# Patient Record
Sex: MALE | Race: WHITE | NOT HISPANIC OR LATINO
[De-identification: names, ages, dates, MRNs, and addresses within clinical notes are randomized per-mention and may not be internally consistent; named-entity substitution may affect disease eponyms.]

---

## 2017-01-27 ENCOUNTER — APPOINTMENT (OUTPATIENT)
Dept: CARDIOLOGY | Facility: CLINIC | Age: 52
End: 2017-01-27

## 2017-02-03 ENCOUNTER — APPOINTMENT (OUTPATIENT)
Dept: CARDIOLOGY | Facility: CLINIC | Age: 52
End: 2017-02-03

## 2017-02-03 VITALS
DIASTOLIC BLOOD PRESSURE: 88 MMHG | HEART RATE: 67 BPM | WEIGHT: 194 LBS | HEIGHT: 69 IN | SYSTOLIC BLOOD PRESSURE: 124 MMHG | BODY MASS INDEX: 28.73 KG/M2

## 2017-02-14 ENCOUNTER — MEDICATION RENEWAL (OUTPATIENT)
Age: 52
End: 2017-02-14

## 2017-06-02 ENCOUNTER — APPOINTMENT (OUTPATIENT)
Dept: CARDIOLOGY | Facility: CLINIC | Age: 52
End: 2017-06-02

## 2018-01-05 ENCOUNTER — APPOINTMENT (OUTPATIENT)
Dept: CARDIOLOGY | Facility: CLINIC | Age: 53
End: 2018-01-05

## 2018-01-05 VITALS
HEIGHT: 69 IN | BODY MASS INDEX: 27.7 KG/M2 | SYSTOLIC BLOOD PRESSURE: 142 MMHG | WEIGHT: 187 LBS | DIASTOLIC BLOOD PRESSURE: 86 MMHG | HEART RATE: 71 BPM

## 2018-02-23 ENCOUNTER — APPOINTMENT (OUTPATIENT)
Dept: CARDIOLOGY | Facility: CLINIC | Age: 53
End: 2018-02-23

## 2018-04-06 ENCOUNTER — APPOINTMENT (OUTPATIENT)
Dept: CARDIOLOGY | Facility: CLINIC | Age: 53
End: 2018-04-06

## 2018-05-02 ENCOUNTER — INPATIENT (INPATIENT)
Facility: HOSPITAL | Age: 53
LOS: 0 days | Discharge: HOME | End: 2018-05-03
Attending: INTERNAL MEDICINE | Admitting: INTERNAL MEDICINE

## 2018-05-02 VITALS
HEART RATE: 79 BPM | TEMPERATURE: 96 F | DIASTOLIC BLOOD PRESSURE: 85 MMHG | RESPIRATION RATE: 18 BRPM | SYSTOLIC BLOOD PRESSURE: 147 MMHG | OXYGEN SATURATION: 100 %

## 2018-05-02 DIAGNOSIS — R07.9 CHEST PAIN, UNSPECIFIED: ICD-10-CM

## 2018-05-02 DIAGNOSIS — Z98.890 OTHER SPECIFIED POSTPROCEDURAL STATES: Chronic | ICD-10-CM

## 2018-05-02 DIAGNOSIS — Z90.49 ACQUIRED ABSENCE OF OTHER SPECIFIED PARTS OF DIGESTIVE TRACT: Chronic | ICD-10-CM

## 2018-05-02 DIAGNOSIS — E11.9 TYPE 2 DIABETES MELLITUS WITHOUT COMPLICATIONS: ICD-10-CM

## 2018-05-02 DIAGNOSIS — I10 ESSENTIAL (PRIMARY) HYPERTENSION: ICD-10-CM

## 2018-05-02 DIAGNOSIS — E78.00 PURE HYPERCHOLESTEROLEMIA, UNSPECIFIED: ICD-10-CM

## 2018-05-02 LAB
ALBUMIN SERPL ELPH-MCNC: 4.1 G/DL — SIGNIFICANT CHANGE UP (ref 3.5–5.2)
ALP SERPL-CCNC: 52 U/L — SIGNIFICANT CHANGE UP (ref 30–115)
ALT FLD-CCNC: 25 U/L — SIGNIFICANT CHANGE UP (ref 0–41)
ANION GAP SERPL CALC-SCNC: 17 MMOL/L — HIGH (ref 7–14)
APTT BLD: 26.3 SEC — LOW (ref 27–39.2)
AST SERPL-CCNC: 24 U/L — SIGNIFICANT CHANGE UP (ref 0–41)
BASOPHILS # BLD AUTO: 0.04 K/UL — SIGNIFICANT CHANGE UP (ref 0–0.2)
BASOPHILS NFR BLD AUTO: 0.5 % — SIGNIFICANT CHANGE UP (ref 0–1)
BILIRUB SERPL-MCNC: 1.1 MG/DL — SIGNIFICANT CHANGE UP (ref 0.2–1.2)
BUN SERPL-MCNC: 15 MG/DL — SIGNIFICANT CHANGE UP (ref 10–20)
CALCIUM SERPL-MCNC: 8.9 MG/DL — SIGNIFICANT CHANGE UP (ref 8.5–10.1)
CHLORIDE SERPL-SCNC: 96 MMOL/L — LOW (ref 98–110)
CK SERPL-CCNC: 66 U/L — SIGNIFICANT CHANGE UP (ref 0–225)
CK SERPL-CCNC: 89 U/L — SIGNIFICANT CHANGE UP (ref 0–225)
CO2 SERPL-SCNC: 23 MMOL/L — SIGNIFICANT CHANGE UP (ref 17–32)
CREAT SERPL-MCNC: 0.8 MG/DL — SIGNIFICANT CHANGE UP (ref 0.7–1.5)
EOSINOPHIL # BLD AUTO: 0.36 K/UL — SIGNIFICANT CHANGE UP (ref 0–0.7)
EOSINOPHIL NFR BLD AUTO: 4.2 % — SIGNIFICANT CHANGE UP (ref 0–8)
GLUCOSE SERPL-MCNC: 220 MG/DL — HIGH (ref 70–99)
HCT VFR BLD CALC: 43.6 % — SIGNIFICANT CHANGE UP (ref 42–52)
HGB BLD-MCNC: 15.3 G/DL — SIGNIFICANT CHANGE UP (ref 14–18)
IMM GRANULOCYTES NFR BLD AUTO: 0.5 % — HIGH (ref 0.1–0.3)
INR BLD: 1.17 RATIO — SIGNIFICANT CHANGE UP (ref 0.65–1.3)
LIDOCAIN IGE QN: 41 U/L — SIGNIFICANT CHANGE UP (ref 7–60)
LYMPHOCYTES # BLD AUTO: 1.76 K/UL — SIGNIFICANT CHANGE UP (ref 1.2–3.4)
LYMPHOCYTES # BLD AUTO: 20.5 % — SIGNIFICANT CHANGE UP (ref 20.5–51.1)
MCHC RBC-ENTMCNC: 30 PG — SIGNIFICANT CHANGE UP (ref 27–31)
MCHC RBC-ENTMCNC: 35.1 G/DL — SIGNIFICANT CHANGE UP (ref 32–37)
MCV RBC AUTO: 85.5 FL — SIGNIFICANT CHANGE UP (ref 80–94)
MONOCYTES # BLD AUTO: 0.67 K/UL — HIGH (ref 0.1–0.6)
MONOCYTES NFR BLD AUTO: 7.8 % — SIGNIFICANT CHANGE UP (ref 1.7–9.3)
NEUTROPHILS # BLD AUTO: 5.71 K/UL — SIGNIFICANT CHANGE UP (ref 1.4–6.5)
NEUTROPHILS NFR BLD AUTO: 66.5 % — SIGNIFICANT CHANGE UP (ref 42.2–75.2)
NRBC # BLD: 0 /100 WBCS — SIGNIFICANT CHANGE UP (ref 0–0)
PLATELET # BLD AUTO: 262 K/UL — SIGNIFICANT CHANGE UP (ref 130–400)
POTASSIUM SERPL-MCNC: 4.5 MMOL/L — SIGNIFICANT CHANGE UP (ref 3.5–5)
POTASSIUM SERPL-SCNC: 4.5 MMOL/L — SIGNIFICANT CHANGE UP (ref 3.5–5)
PROT SERPL-MCNC: 6.7 G/DL — SIGNIFICANT CHANGE UP (ref 6–8)
PROTHROM AB SERPL-ACNC: 12.7 SEC — SIGNIFICANT CHANGE UP (ref 9.95–12.87)
RBC # BLD: 5.1 M/UL — SIGNIFICANT CHANGE UP (ref 4.7–6.1)
RBC # FLD: 11.5 % — SIGNIFICANT CHANGE UP (ref 11.5–14.5)
SODIUM SERPL-SCNC: 136 MMOL/L — SIGNIFICANT CHANGE UP (ref 135–146)
TROPONIN T SERPL-MCNC: <0.01 NG/ML — SIGNIFICANT CHANGE UP
TROPONIN T SERPL-MCNC: <0.01 NG/ML — SIGNIFICANT CHANGE UP
WBC # BLD: 8.58 K/UL — SIGNIFICANT CHANGE UP (ref 4.8–10.8)
WBC # FLD AUTO: 8.58 K/UL — SIGNIFICANT CHANGE UP (ref 4.8–10.8)

## 2018-05-02 RX ORDER — CARVEDILOL PHOSPHATE 80 MG/1
12.5 CAPSULE, EXTENDED RELEASE ORAL EVERY 12 HOURS
Qty: 0 | Refills: 0 | Status: DISCONTINUED | OUTPATIENT
Start: 2018-05-02 | End: 2018-05-03

## 2018-05-02 RX ORDER — DEXTROSE 50 % IN WATER 50 %
1 SYRINGE (ML) INTRAVENOUS ONCE
Qty: 0 | Refills: 0 | Status: DISCONTINUED | OUTPATIENT
Start: 2018-05-02 | End: 2018-05-03

## 2018-05-02 RX ORDER — ATORVASTATIN CALCIUM 80 MG/1
10 TABLET, FILM COATED ORAL AT BEDTIME
Qty: 0 | Refills: 0 | Status: DISCONTINUED | OUTPATIENT
Start: 2018-05-02 | End: 2018-05-03

## 2018-05-02 RX ORDER — GLIMEPIRIDE 1 MG
4 TABLET ORAL
Qty: 0 | Refills: 0 | COMMUNITY

## 2018-05-02 RX ORDER — DEXTROSE 50 % IN WATER 50 %
25 SYRINGE (ML) INTRAVENOUS ONCE
Qty: 0 | Refills: 0 | Status: DISCONTINUED | OUTPATIENT
Start: 2018-05-02 | End: 2018-05-03

## 2018-05-02 RX ORDER — CARVEDILOL PHOSPHATE 80 MG/1
0 CAPSULE, EXTENDED RELEASE ORAL
Qty: 0 | Refills: 0 | COMMUNITY

## 2018-05-02 RX ORDER — LORATADINE 10 MG/1
10 TABLET ORAL AT BEDTIME
Qty: 0 | Refills: 0 | Status: DISCONTINUED | OUTPATIENT
Start: 2018-05-02 | End: 2018-05-03

## 2018-05-02 RX ORDER — ACETAMINOPHEN 500 MG
650 TABLET ORAL EVERY 6 HOURS
Qty: 0 | Refills: 0 | Status: DISCONTINUED | OUTPATIENT
Start: 2018-05-02 | End: 2018-05-03

## 2018-05-02 RX ORDER — CANAGLIFLOZIN AND METFORMIN HYDROCHLORIDE 50; 500 MG/1; MG/1
0 TABLET, FILM COATED, EXTENDED RELEASE ORAL
Qty: 0 | Refills: 0 | COMMUNITY

## 2018-05-02 RX ORDER — ENOXAPARIN SODIUM 100 MG/ML
40 INJECTION SUBCUTANEOUS DAILY
Qty: 0 | Refills: 0 | Status: DISCONTINUED | OUTPATIENT
Start: 2018-05-02 | End: 2018-05-03

## 2018-05-02 RX ORDER — ASPIRIN/CALCIUM CARB/MAGNESIUM 324 MG
0 TABLET ORAL
Qty: 0 | Refills: 0 | COMMUNITY

## 2018-05-02 RX ORDER — DEXTROSE 50 % IN WATER 50 %
12.5 SYRINGE (ML) INTRAVENOUS ONCE
Qty: 0 | Refills: 0 | Status: DISCONTINUED | OUTPATIENT
Start: 2018-05-02 | End: 2018-05-03

## 2018-05-02 RX ORDER — INSULIN LISPRO 100/ML
5 VIAL (ML) SUBCUTANEOUS
Qty: 0 | Refills: 0 | Status: DISCONTINUED | OUTPATIENT
Start: 2018-05-02 | End: 2018-05-03

## 2018-05-02 RX ORDER — IBUPROFEN 200 MG
400 TABLET ORAL EVERY 8 HOURS
Qty: 0 | Refills: 0 | Status: DISCONTINUED | OUTPATIENT
Start: 2018-05-02 | End: 2018-05-03

## 2018-05-02 RX ORDER — INSULIN GLARGINE 100 [IU]/ML
15 INJECTION, SOLUTION SUBCUTANEOUS AT BEDTIME
Qty: 0 | Refills: 0 | Status: DISCONTINUED | OUTPATIENT
Start: 2018-05-02 | End: 2018-05-03

## 2018-05-02 RX ORDER — GLUCAGON INJECTION, SOLUTION 0.5 MG/.1ML
1 INJECTION, SOLUTION SUBCUTANEOUS ONCE
Qty: 0 | Refills: 0 | Status: DISCONTINUED | OUTPATIENT
Start: 2018-05-02 | End: 2018-05-03

## 2018-05-02 RX ORDER — NIFEDIPINE 30 MG
30 TABLET, EXTENDED RELEASE 24 HR ORAL DAILY
Qty: 0 | Refills: 0 | Status: DISCONTINUED | OUTPATIENT
Start: 2018-05-02 | End: 2018-05-03

## 2018-05-02 RX ORDER — ASPIRIN/CALCIUM CARB/MAGNESIUM 324 MG
325 TABLET ORAL ONCE
Qty: 0 | Refills: 0 | Status: DISCONTINUED | OUTPATIENT
Start: 2018-05-02 | End: 2018-05-02

## 2018-05-02 RX ORDER — ATORVASTATIN CALCIUM 80 MG/1
0 TABLET, FILM COATED ORAL
Qty: 0 | Refills: 0 | COMMUNITY

## 2018-05-02 RX ORDER — SODIUM CHLORIDE 9 MG/ML
1000 INJECTION, SOLUTION INTRAVENOUS
Qty: 0 | Refills: 0 | Status: DISCONTINUED | OUTPATIENT
Start: 2018-05-02 | End: 2018-05-03

## 2018-05-02 RX ORDER — LISINOPRIL 2.5 MG/1
10 TABLET ORAL DAILY
Qty: 0 | Refills: 0 | Status: DISCONTINUED | OUTPATIENT
Start: 2018-05-02 | End: 2018-05-03

## 2018-05-02 RX ORDER — NIFEDIPINE 30 MG
0 TABLET, EXTENDED RELEASE 24 HR ORAL
Qty: 0 | Refills: 0 | COMMUNITY

## 2018-05-02 RX ORDER — GLIMEPIRIDE 1 MG
0 TABLET ORAL
Qty: 0 | Refills: 0 | COMMUNITY

## 2018-05-02 RX ORDER — ASPIRIN/CALCIUM CARB/MAGNESIUM 324 MG
81 TABLET ORAL DAILY
Qty: 0 | Refills: 0 | Status: DISCONTINUED | OUTPATIENT
Start: 2018-05-02 | End: 2018-05-03

## 2018-05-02 RX ORDER — LISINOPRIL 2.5 MG/1
0 TABLET ORAL
Qty: 0 | Refills: 0 | COMMUNITY

## 2018-05-02 RX ORDER — FAMOTIDINE 10 MG/ML
20 INJECTION INTRAVENOUS DAILY
Qty: 0 | Refills: 0 | Status: DISCONTINUED | OUTPATIENT
Start: 2018-05-02 | End: 2018-05-03

## 2018-05-02 RX ADMIN — INSULIN GLARGINE 15 UNIT(S): 100 INJECTION, SOLUTION SUBCUTANEOUS at 21:51

## 2018-05-02 RX ADMIN — ATORVASTATIN CALCIUM 10 MILLIGRAM(S): 80 TABLET, FILM COATED ORAL at 21:50

## 2018-05-02 RX ADMIN — CARVEDILOL PHOSPHATE 12.5 MILLIGRAM(S): 80 CAPSULE, EXTENDED RELEASE ORAL at 17:36

## 2018-05-02 RX ADMIN — LORATADINE 10 MILLIGRAM(S): 10 TABLET ORAL at 21:50

## 2018-05-02 RX ADMIN — Medication 5 UNIT(S): at 17:35

## 2018-05-02 NOTE — ED PROVIDER NOTE - OBJECTIVE STATEMENT
Patient pmhx DM, HTN, Hypercholesterolemia presents to the ED for evaluation of exertional chest pain intermittent for the past week radiating to left arm and left jaw area. No pain at present. No abd pain, no shortness of breath. +URI symptoms the week prior but improved.

## 2018-05-02 NOTE — ED PROVIDER NOTE - ATTENDING CONTRIBUTION TO CARE
52yoM with h/o MI s/p stenting p/w chest pain worsened by exertion with occasional radiation to L jaw and arm. Denies leg pain/swelling/dizziness/SOB. On exam, afebrile, hemodynamically stable, saturating well, NAD, well appearing, head NCAT, EOMI grossly, anicteric, MMM, no JVD, RRR, nml S1/S2, no m/r/g, lungs CTAB, no w/r/r, abd soft, NT, ND, nml BS, no rebound or guarding, AAO, CN's 3-12 grossly intact, CAUSEY spontaneously, no leg cyanosis or edema, skin warm, well perfused, no rashes or hives. ECG/trop unremarkable however significant cardiac hx, d/w Dr. Borja who is the patient's cardiologist and evaluated pt and requested full admission for cardiac w/u. Low suspicion by character for dissection and no murmur or pulse asymmetry. Low suspicion for PE by character and lack of associated symptoms. No PTX or PNA on exam or Xray. Patient very well appearing, hemodynamically stable. Given ASA. Admitted to tele.

## 2018-05-02 NOTE — ED PROVIDER NOTE - CARE PLAN
Principal Discharge DX:	Chest pain Principal Discharge DX:	Chest pain, rule out acute myocardial infarction

## 2018-05-02 NOTE — H&P ADULT - PROBLEM SELECTOR PLAN 1
rule out stable angina/CAD vs pericarditis/pleuritis vs costochondritis vs psychosomatic  cardiac enzymes x 2  cardiology evaluation: stress test?  aspirin, statin, B blocker, ACE inhibitor  PRN pain meds tylenol/NSAIDs  admit to telemetry

## 2018-05-02 NOTE — H&P ADULT - PMH
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

## 2018-05-02 NOTE — ED PROVIDER NOTE - PHYSICAL EXAMINATION
Gen: Alert, NAD, well appearing  Neck: +supple, no tenderness  Pulm: Bilateral BS, normal resp effort, no wheeze/stridor/retractions  CV: RRR, no M/R/G, +dist pulses  Abd: soft, NT/ND  Mskel: no edema/erythema/cyanosis  Skin: no rash, warm/dry  Neuro: AAOx3

## 2018-05-02 NOTE — CONSULT NOTE ADULT - SUBJECTIVE AND OBJECTIVE BOX
Cardiology Consultation    CHIEF COMPLAINT: Patient is a 52y old  Male who presents with a chief complaint of chest pain of 2 weeks duration (02 May 2018 15:53)      HPI:  52 y.o man with PMH CAD s/p PCI(2 stents in 2014), HTN, DM, DL presenting from home for chest pain of 2 weeks duration    History goes back to 2 weeks prior to presentation, patient started complaining of intermittent left sided chest pain multiple times/day of seconds to minutes duration, sharp in nature, radiating to the right chest, neck, left shoulder and left arm, worse with exertion, relieved by lying in bed. Patient denies any associated SOB, nausea, cold sweating no palpitations.  1 week prior to presentation, patient had dry cough with runny nose and was feeling warm with chills did not check his temperature no phlegm no headaches. + sick contacts with similar symptoms.  The last few days prior to presentation, pain became more severe and sharp so he decided to present to the ED for further investigations. (02 May 2018 15:53)      PAST MEDICAL & SURGICAL HISTORY:  Coronary artery disease  High cholesterol  Hypertension, unspecified type  H/O shoulder surgery  H/O knee surgery  History of appendectomy      SOCIAL HISTORY: Non smoker, no etoh or drug abuse    FAMILY HISTORY: FAMILY HISTORY:      Home Medications:  Aspirin Low Dose 81 mg oral delayed release tablet: 1 tab(s) orally once a day (02 May 2018 16:23)  atorvastatin 10 mg oral tablet: 1 tab(s) orally once a day (02 May 2018 16:23)  carvedilol: 12.5  orally 2 times a day (02 May 2018 16:23)  glimepiride 4 mg oral tablet: orally 2 times a day (02 May 2018 16:23)  Invokamet 50 mg-1000 mg oral tablet: 1 tab(s) orally 2 times a day (with meals) (02 May 2018 16:23)  lisinopril 10 mg oral tablet: 1 tab(s) orally once a day (02 May 2018 16:23)  NIFEdipine 30 mg oral tablet, extended release: 1 tab(s) orally once a day (02 May 2018 16:23)      MEDICATIONS  (STANDING):  aspirin enteric coated 81 milliGRAM(s) Oral daily  atorvastatin 10 milliGRAM(s) Oral at bedtime  carvedilol 12.5 milliGRAM(s) Oral every 12 hours  dextrose 5%. 1000 milliLiter(s) (50 mL/Hr) IV Continuous <Continuous>  dextrose 50% Injectable 12.5 Gram(s) IV Push once  dextrose 50% Injectable 25 Gram(s) IV Push once  dextrose 50% Injectable 25 Gram(s) IV Push once  enoxaparin Injectable 40 milliGRAM(s) SubCutaneous daily  famotidine    Tablet 20 milliGRAM(s) Oral daily  insulin glargine Injectable (LANTUS) 15 Unit(s) SubCutaneous at bedtime  insulin lispro Injectable (HumaLOG) 5 Unit(s) SubCutaneous before breakfast  insulin lispro Injectable (HumaLOG) 5 Unit(s) SubCutaneous before lunch  insulin lispro Injectable (HumaLOG) 5 Unit(s) SubCutaneous before dinner  lisinopril 10 milliGRAM(s) Oral daily  loratadine 10 milliGRAM(s) Oral at bedtime  NIFEdipine XL 30 milliGRAM(s) Oral daily    MEDICATIONS  (PRN):  acetaminophen  Suppository. 650 milliGRAM(s) Rectal every 6 hours PRN Mild Pain (1 - 3)  dextrose Gel 1 Dose(s) Oral once PRN Blood Glucose LESS THAN 70 milliGRAM(s)/deciliter  glucagon  Injectable 1 milliGRAM(s) IntraMuscular once PRN Glucose LESS THAN 70 milligrams/deciliter  ibuprofen  Tablet 400 milliGRAM(s) Oral every 8 hours PRN moderate pain      Allergies    penicillin (Unknown)    Intolerances        REVIEW OF SYSTEMS:    All other review of systems is negative unless indicated above    VITAL SIGNS:   Vital Signs Last 24 Hrs  T(C): 36.3 (02 May 2018 16:18), Max: 36.3 (02 May 2018 16:18)  T(F): 97.3 (02 May 2018 16:18), Max: 97.3 (02 May 2018 16:18)  HR: 74 (02 May 2018 16:18) (69 - 79)  BP: 148/86 (02 May 2018 16:18) (147/85 - 150/82)  BP(mean): --  RR: 18 (02 May 2018 16:18) (18 - 18)  SpO2: 98% (02 May 2018 15:26) (98% - 100%)    I&O's Summary      PHYSICAL EXAM:    Constitutional: NAD, awake and alert, well-developed  Pulmonary: Non-labored, breath sounds are clear bilaterally, No wheezing, rales or rhonchi  Cardiovascular: PMI not palpable non-displaced Regular S1 and S2, no murmurs, rubs, gallops or clicks  Gastrointestinal: Bowel Sounds present, soft, nontender.   Neurological: Alert, no focal deficits  LE: no edema      LABS: All Labs Reviewed:                        15.3   8.58  )-----------( 262      ( 02 May 2018 12:53 )             43.6     02 May 2018 12:53    136    |  96     |  15     ----------------------------<  220    4.5     |  23     |  0.8      Ca    8.9        02 May 2018 12:53    TPro  6.7    /  Alb  4.1    /  TBili  1.1    /  DBili  x      /  AST  24     /  ALT  25     /  AlkPhos  52     02 May 2018 12:53      CARDIAC MARKERS ( 02 May 2018 12:53 )  x     / <0.01 ng/mL / 89 U/L / x     / x            RADIOLOGY/EKG:    < from: 12 Lead ECG (05.02.18 @ 11:52) >  Ventricular Rate 71 BPM    Atrial Rate 71 BPM    P-R Interval 138 ms    QRS Duration 102 ms    Q-T Interval 410 ms    QTC Calculation(Bezet) 445 ms    P Axis 37 degrees    R Axis 28 degrees    T Axis 40 degrees    Diagnosis Line Normal sinus rhythm  Normal ECG    < end of copied text >

## 2018-05-02 NOTE — ED PROVIDER NOTE - NS ED ROS FT
Review of Systems  Constitutional: (-) fever  Eyes/ENT: (-) blurry vision, (-) epistaxis  Cardiovascular: (+) chest pain, (-) syncope  Respiratory: (-) cough, (-) shortness of breath  Gastrointestinal: (-) vomiting, (-) diarrhea  Musculoskeletal: (-) neck pain, (-) back pain, (-) joint pain  Integumentary: (-) rash, (-) edema  Neurological: (-) headache

## 2018-05-02 NOTE — ED PROVIDER NOTE - PROGRESS NOTE DETAILS
Case discussed with Dr Borja will send fellow to see patient Case discussed with Dr Borja will send fellow to see patient, sts patient can be admitted to tele if normal trop

## 2018-05-02 NOTE — H&P ADULT - ASSESSMENT
52 y.o man with PMH CAD s/p PCI(2 stents in 2014), HTN, DM, DL presenting from home for chest pain of 2 weeks duration

## 2018-05-02 NOTE — H&P ADULT - NSHPLABSRESULTS_GEN_ALL_CORE
15.3   8.58  )-----------( 262      ( 02 May 2018 12:53 )             43.6     05-02    136  |  96<L>  |  15  ----------------------------<  220<H>  4.5   |  23  |  0.8    Ca    8.9      02 May 2018 12:53    TPro  6.7  /  Alb  4.1  /  TBili  1.1  /  DBili  x   /  AST  24  /  ALT  25  /  AlkPhos  52  05-02    EKG normal  CXR no acute findings

## 2018-05-02 NOTE — H&P ADULT - NSHPPHYSICALEXAM_GEN_ALL_CORE
Vital Signs Last 24 Hrs  T(C): 36.1 (02 May 2018 15:26), Max: 36.1 (02 May 2018 15:26)  T(F): 96.9 (02 May 2018 15:26), Max: 96.9 (02 May 2018 15:26)  HR: 69 (02 May 2018 15:26) (69 - 79)  BP: 150/82 (02 May 2018 15:26) (147/85 - 150/82)  RR: 18 (02 May 2018 15:26) (18 - 18)  SpO2: 98% (02 May 2018 15:26) (98% - 100%)  Patient is sitting in bed in no distress  AAO3  heart regular s1-s2 no murmur no JVD   Lungs good air entry   abdomen soft non tender  no LE edema Vital Signs Last 24 Hrs  T(C): 36.1 (02 May 2018 15:26), Max: 36.1 (02 May 2018 15:26)  T(F): 96.9 (02 May 2018 15:26), Max: 96.9 (02 May 2018 15:26)  HR: 69 (02 May 2018 15:26) (69 - 79)  BP: 150/82 (02 May 2018 15:26) (147/85 - 150/82)  RR: 18 (02 May 2018 15:26) (18 - 18)  SpO2: 98% (02 May 2018 15:26) (98% - 100%)  Patient is sitting in bed in no distress  AAO3  heart regular s1-s2 no murmur no JVD   Lungs good air entry  + chest tenderness  abdomen soft non tender  no LE edema

## 2018-05-02 NOTE — ED ADULT NURSE NOTE - PMH
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

## 2018-05-02 NOTE — H&P ADULT - ATTENDING COMMENTS
pt seen and examined independently, admitted with chest paIN, ruled out mi with cardiac enzymes,   saw cardio in er, underwent nuclear stress, if negative dc home '  fu with dr xavier

## 2018-05-02 NOTE — CONSULT NOTE ADULT - ASSESSMENT
51 y/o M with significant h/o CAD s/p PCI 2014 coming for chest pain X 2 weeks    A/P    Atypical chest pain   CAD s/p PCI  HTN  HLD    Recommendations    Continue ASA, Statin and BB  Repeat CE if 2nd set -ve get exercise nuclear stress test in AM 53 y/o M with significant h/o CAD s/p PCI 2014 coming for chest pain X 2 weeks    A/P    Atypical chest pain   CAD s/p PCI  HTN  HLD    Recommendations    Continue ASA, Statin and BB  Repeat CE if 2nd set -ve get exercise nuclear stress test in AM  check A1c, lipid panel  If stress mpi low risk - med rx, mod to high risk - cath

## 2018-05-03 ENCOUNTER — TRANSCRIPTION ENCOUNTER (OUTPATIENT)
Age: 53
End: 2018-05-03

## 2018-05-03 VITALS
RESPIRATION RATE: 18 BRPM | DIASTOLIC BLOOD PRESSURE: 70 MMHG | SYSTOLIC BLOOD PRESSURE: 122 MMHG | TEMPERATURE: 97 F | HEART RATE: 66 BPM

## 2018-05-03 LAB
ESTIMATED AVERAGE GLUCOSE: 246 MG/DL — HIGH (ref 68–114)
HBA1C BLD-MCNC: 10.2 % — HIGH (ref 4–5.6)

## 2018-05-03 RX ADMIN — FAMOTIDINE 20 MILLIGRAM(S): 10 INJECTION INTRAVENOUS at 13:43

## 2018-05-03 RX ADMIN — Medication 5 UNIT(S): at 09:25

## 2018-05-03 RX ADMIN — CARVEDILOL PHOSPHATE 12.5 MILLIGRAM(S): 80 CAPSULE, EXTENDED RELEASE ORAL at 18:04

## 2018-05-03 RX ADMIN — ENOXAPARIN SODIUM 40 MILLIGRAM(S): 100 INJECTION SUBCUTANEOUS at 13:42

## 2018-05-03 RX ADMIN — CARVEDILOL PHOSPHATE 12.5 MILLIGRAM(S): 80 CAPSULE, EXTENDED RELEASE ORAL at 06:34

## 2018-05-03 RX ADMIN — Medication 1 DROP(S): at 18:04

## 2018-05-03 RX ADMIN — Medication 5 UNIT(S): at 13:43

## 2018-05-03 RX ADMIN — Medication 1 DROP(S): at 06:34

## 2018-05-03 RX ADMIN — LISINOPRIL 10 MILLIGRAM(S): 2.5 TABLET ORAL at 06:34

## 2018-05-03 RX ADMIN — Medication 30 MILLIGRAM(S): at 06:34

## 2018-05-03 RX ADMIN — Medication 81 MILLIGRAM(S): at 13:43

## 2018-05-03 NOTE — DISCHARGE NOTE ADULT - MEDICATION SUMMARY - MEDICATIONS TO TAKE
I will START or STAY ON the medications listed below when I get home from the hospital:    Aspirin Low Dose 81 mg oral delayed release tablet  -- 1 tab(s) by mouth once a day  -- Indication: For Coronary artery disease    lisinopril 10 mg oral tablet  -- 1 tab(s) by mouth once a day  -- Indication: For Hypertension, unspecified type    glimepiride 4 mg oral tablet  -- orally 2 times a day  -- Indication: For Diabetes    Invokamet 50 mg-1000 mg oral tablet  -- 1 tab(s) by mouth 2 times a day (with meals)  -- Indication: For Diabetes    atorvastatin 10 mg oral tablet  -- 1 tab(s) by mouth once a day  -- Indication: For High cholesterol    carvedilol  -- 12.5  by mouth 2 times a day  -- Indication: For Hypertension, unspecified type    NIFEdipine 30 mg oral tablet, extended release  -- 1 tab(s) by mouth once a day  -- Indication: For Hypertension, unspecified type

## 2018-05-03 NOTE — PROGRESS NOTE ADULT - SUBJECTIVE AND OBJECTIVE BOX
SUBJECTIVE:    Patient is a 52y old Male who presents with a chief complaint of chest pain of 2 weeks duration (02 May 2018 15:53)    Currently admitted to medicine with the primary diagnosis of Chest pain, rule out acute myocardial infarction    52 y.o man with PMH CAD s/p PCI(2 stents in 2014), HTN, DM, DL presenting from home for chest pain of 2 weeks duration    History goes back to 2 weeks prior to presentation, patient started complaining of intermittent left sided chest pain multiple times/day of seconds to minutes duration, sharp in nature, radiating to the right chest, neck, left shoulder and left arm, worse with exertion, relieved by lying in bed. Patient denies any associated SOB, nausea, cold sweating no palpitations.  1 week prior to presentation, patient had dry cough with runny nose and was feeling warm with chills did not check his temperature no phlegm no headaches. + sick contacts with similar symptoms.  The last few days prior to presentation, pain became more severe and sharp so he decided to present to the ED for further investigations.     Today is hospital day 1d. This morning he is resting comfortably in bed and reports no new issues or overnight events.   Feels well, no current complaints.    PAST MEDICAL & SURGICAL HISTORY  Coronary artery disease  High cholesterol  Hypertension, unspecified type  H/O shoulder surgery  H/O knee surgery  History of appendectomy    SOCIAL HISTORY:  Negative for smoking/alcohol/drug use.     ALLERGIES:  penicillin (Unknown)    MEDICATIONS:  STANDING MEDICATIONS  artificial  tears Solution 1 Drop(s) Both EYES two times a day  aspirin enteric coated 81 milliGRAM(s) Oral daily  atorvastatin 10 milliGRAM(s) Oral at bedtime  carvedilol 12.5 milliGRAM(s) Oral every 12 hours  dextrose 5%. 1000 milliLiter(s) IV Continuous <Continuous>  dextrose 50% Injectable 12.5 Gram(s) IV Push once  dextrose 50% Injectable 25 Gram(s) IV Push once  dextrose 50% Injectable 25 Gram(s) IV Push once  enoxaparin Injectable 40 milliGRAM(s) SubCutaneous daily  famotidine    Tablet 20 milliGRAM(s) Oral daily  insulin glargine Injectable (LANTUS) 15 Unit(s) SubCutaneous at bedtime  insulin lispro Injectable (HumaLOG) 5 Unit(s) SubCutaneous before breakfast  insulin lispro Injectable (HumaLOG) 5 Unit(s) SubCutaneous before lunch  insulin lispro Injectable (HumaLOG) 5 Unit(s) SubCutaneous before dinner  lisinopril 10 milliGRAM(s) Oral daily  loratadine 10 milliGRAM(s) Oral at bedtime  NIFEdipine XL 30 milliGRAM(s) Oral daily    PRN MEDICATIONS  acetaminophen  Suppository. 650 milliGRAM(s) Rectal every 6 hours PRN  dextrose Gel 1 Dose(s) Oral once PRN  glucagon  Injectable 1 milliGRAM(s) IntraMuscular once PRN  ibuprofen  Tablet 400 milliGRAM(s) Oral every 8 hours PRN    VITALS:   T(F): 97.2  HR: 66  BP: 122/70  RR: 18  SpO2: 98%    LABS:                        15.3   8.58  )-----------( 262      ( 02 May 2018 12:53 )             43.6     05-02    136  |  96<L>  |  15  ----------------------------<  220<H>  4.5   |  23  |  0.8    Ca    8.9      02 May 2018 12:53    TPro  6.7  /  Alb  4.1  /  TBili  1.1  /  DBili  x   /  AST  24  /  ALT  25  /  AlkPhos  52  05-02    PT/INR - ( 02 May 2018 21:06 )   PT: 12.70 sec;   INR: 1.17 ratio         PTT - ( 02 May 2018 21:06 )  PTT:26.3 sec      Creatine Kinase, Serum: 66 U/L (05-02-18 @ 21:06)  Troponin T, Serum: <0.01 ng/mL (05-02-18 @ 21:06)      CARDIAC MARKERS ( 02 May 2018 21:06 )  x     / <0.01 ng/mL / 66 U/L / x     / x      CARDIAC MARKERS ( 02 May 2018 12:53 )  x     / <0.01 ng/mL / 89 U/L / x     / x          RADIOLOGY:    < from: NM Nuclear Stress Multiple (05.03.18 @ 13:50) >  Impression:   1. NORMAL STRESS AND REST MYOCARDIAL PERFUSION TOMOGRAPHY, WITH NO   EVIDENCE FOR ISCHEMIA AT THE LEVEL OF EXERCISE ATTAINED.   2. NORMAL RESTING LEFT VENTRICULAR WALL MOTION AND WALL THICKENING.  3. LEFT VENTRICULAR EJECTION FRACTION OF 60 % WHICH IS WITHIN RANGE OF   NORMAL.     < end of copied text >      PHYSICAL EXAM:  GEN: Middle aged M, lying in bed. In no acute distress  LUNGS: Clear to auscultation bilaterally   HEART: S1/S2 present. RRR.   ABD: Soft, non-tender, non-distended. Bowel sounds present  EXT: NC/NC/NE/2+PP/CAUSEY  NEURO: AAOX3

## 2018-05-03 NOTE — DISCHARGE NOTE ADULT - PLAN OF CARE
rule out ACS Stress test, ECG, Cardiac enzymes negative for concerning changes Continue home medications, but follow up with PMD to adjust medications as HbA1c 10.2 Continue home medications

## 2018-05-03 NOTE — DISCHARGE NOTE ADULT - CARE PLAN
Principal Discharge DX:	Chest pain  Goal:	rule out ACS  Assessment and plan of treatment:	Stress test, ECG, Cardiac enzymes negative for concerning changes  Secondary Diagnosis:	Diabetes  Assessment and plan of treatment:	Continue home medications, but follow up with PMD to adjust medications as HbA1c 10.2  Secondary Diagnosis:	Hypertension, unspecified type  Assessment and plan of treatment:	Continue home medications

## 2018-05-03 NOTE — PROGRESS NOTE ADULT - ASSESSMENT
52 y.o man with PMH CAD s/p PCI(2 stents in 2014), HTN, DM, DL presenting from home for chest pain of 2 weeks duration       # Chest pain: rule out stable angina/CAD vs pericarditis/pleuritis vs costochondritis vs psychosomatic  - cardiac enzymes x 2 are negative  - cardiology evaluation: stress test as above, will FU cardiology for plan, likely DC home  - aspirin, statin, B blocker, ACE inhibitor  - PRN pain meds tylenol/NSAIDs  - No telemtry events    # Diabetes  - on 3 oral anti glycemic medications  - hold home regimen  - insulin basal + short acting/correction.     # Hypertension  - continue ACE, B blocker and CCB.     # High cholesterol  - continue statin.

## 2018-05-03 NOTE — DISCHARGE NOTE ADULT - HOSPITAL COURSE
Patient admitted with chest pain to rule out ACS. ECG, cardiac enzymes, and ECG unremarkable. Will follow up with cardioogy and PMD as outpatient.

## 2018-05-03 NOTE — DISCHARGE NOTE ADULT - PATIENT PORTAL LINK FT
You can access the [facility] Patient Portal, offered by [facility], by registering with the following website: [email or web address]

## 2018-05-03 NOTE — DISCHARGE NOTE ADULT - CARE PROVIDER_API CALL
Lucian Borja), Cardiovascular Disease; Internal Medicine; Interventional Cardiology; Nuclear Cardiology  [address] [place] [address]  Phone: [phone]  Fax: [phone]

## 2018-05-07 DIAGNOSIS — E78.00 PURE HYPERCHOLESTEROLEMIA, UNSPECIFIED: ICD-10-CM

## 2018-05-07 DIAGNOSIS — I25.2 OLD MYOCARDIAL INFARCTION: ICD-10-CM

## 2018-05-07 DIAGNOSIS — Z79.84 LONG TERM (CURRENT) USE OF ORAL HYPOGLYCEMIC DRUGS: ICD-10-CM

## 2018-05-07 DIAGNOSIS — I25.10 ATHEROSCLEROTIC HEART DISEASE OF NATIVE CORONARY ARTERY WITHOUT ANGINA PECTORIS: ICD-10-CM

## 2018-05-07 DIAGNOSIS — R07.9 CHEST PAIN, UNSPECIFIED: ICD-10-CM

## 2018-05-07 DIAGNOSIS — I10 ESSENTIAL (PRIMARY) HYPERTENSION: ICD-10-CM

## 2018-05-07 DIAGNOSIS — R07.89 OTHER CHEST PAIN: ICD-10-CM

## 2018-05-07 DIAGNOSIS — E11.9 TYPE 2 DIABETES MELLITUS WITHOUT COMPLICATIONS: ICD-10-CM

## 2018-05-07 DIAGNOSIS — Z88.0 ALLERGY STATUS TO PENICILLIN: ICD-10-CM

## 2018-05-07 DIAGNOSIS — Z79.82 LONG TERM (CURRENT) USE OF ASPIRIN: ICD-10-CM

## 2018-05-07 DIAGNOSIS — Z95.5 PRESENCE OF CORONARY ANGIOPLASTY IMPLANT AND GRAFT: ICD-10-CM

## 2018-07-13 NOTE — CHART NOTE - NSCHARTNOTEFT_GEN_A_CORE
Provided patient with lab and test results as requested.     < from: NM Nuclear Stress Multiple (05.03.18 @ 13:50) >      EXAM:  NM NUCLEAR STRESS MULTI          *** ADDENDUM 05/03/2018  ***    These images were interpreted using manual renormalization and upper   level intensity 87 for the stress images      *** END OF ADDENDUM 05/03/2018  ***        PROCEDURE DATE:05/03/2018            INTERPRETATION:  STRESS AND REST MYOCARDIAL PERFUSION TOMOGRAPHY, WALL   MOTION ANALYSIS, LVEF CALCULATION    Reason: Chest pain; coronary or disease (PCI)  3.5 Millicuries 201 Thallium was injected intravenously at rest,and   myocardial perfusion images were acquired over a 180 degree arc.  Then,   during an upright graded treadmill exercise test, 22.5 millicuries 99m   technetium sestamibi was injected intravenously at peak exercise, and   exercise was continued for 1 minute post injection, and myocardial   perfusion images were acquired in the same manner as the resting study.  The patient exercised for 10  minutes 41  seconds  to a heart rate of 146    beats per minute  On viewing a cinematic display of the planar images, there is no   significant patient motion.  On viewing the tomographic images in color and black and white, bullseye   polar map, and three-dimensional surface reconstruction, there is normal   isotope distribution throughout the myocardium on both sets of   acquisitions, with no evidence for ischemia at the level of exercise   attained.  The sestamibi study was acquired as a gated study.  On viewing a   cinematic display of the frames, there is normal resting left ventricular   wall motion and wall thickening.  Analysis of the ventriculogram generates a calculated left ventricular   ejection fraction of 60 % which is within range of normal.  Impression:   1. NORMAL STRESS AND REST MYOCARDIAL PERFUSION TOMOGRAPHY, WITH NO   EVIDENCE FOR ISCHEMIA AT THE LEVEL OF EXERCISE ATTAINED.   2. NORMAL RESTING LEFT VENTRICULAR WALL MOTION AND WALL THICKENING.  3. LEFT VENTRICULAR EJECTION FRACTION OF 60 % WHICH IS WITHIN RANGE OF   NORMAL.         ***Please see the addendum at the top of this report. It may contain   additional important information or changes.****      Labs:                15.3   8.58  )-----------( 262      ( 02 May 2018 12:53 )             43.6       05-02    136  |  96<L>  |  15  ----------------------------<  220<H>  4.5   |  23  |  0.8    Ca    8.9      02 May 2018 12:53    TPro  6.7  /  Alb  4.1  /  TBili  1.1  /  DBili  x   /  AST  24  /  ALT  25  /  AlkPhos  52  05-02          PT/INR - ( 02 May 2018 21:06 )   PT: 12.70 sec;   INR: 1.17 ratio         PTT - ( 02 May 2018 21:06 )  PTT: 26.3 sec      CARDIAC MARKERS ( 02 May 2018 21:06 )  x     / <0.01 ng/mL / 66 U/L / x     / x      CARDIAC MARKERS ( 02 May 2018 12:53 )  x     / <0.01 ng/mL / 89 U/L / x     / x            CAPILLARY BLOOD GLUCOSE  108 (03 May 2018 16:09)    05-03 [id number] 10.2                                DUY MAURO DO, CPE, ATTENDING RADIOLOGIST  This document has been electronically signed. May  3 2018  2:03PM  Addend:  DUY MAURO DO, RANI, ATTENDING RADIOLOGIST    < end of copied text > WDL

## 2020-03-13 ENCOUNTER — APPOINTMENT (OUTPATIENT)
Dept: CARDIOLOGY | Facility: CLINIC | Age: 55
End: 2020-03-13
Payer: COMMERCIAL

## 2020-03-13 ENCOUNTER — TRANSCRIPTION ENCOUNTER (OUTPATIENT)
Age: 55
End: 2020-03-13

## 2020-03-13 VITALS
WEIGHT: 198 LBS | HEIGHT: 69 IN | DIASTOLIC BLOOD PRESSURE: 86 MMHG | SYSTOLIC BLOOD PRESSURE: 146 MMHG | BODY MASS INDEX: 29.33 KG/M2 | HEART RATE: 60 BPM

## 2020-03-13 PROBLEM — E78.00 PURE HYPERCHOLESTEROLEMIA, UNSPECIFIED: Chronic | Status: ACTIVE | Noted: 2018-05-02

## 2020-03-13 PROBLEM — I10 ESSENTIAL (PRIMARY) HYPERTENSION: Chronic | Status: ACTIVE | Noted: 2018-05-02

## 2020-03-13 PROBLEM — I25.10 ATHEROSCLEROTIC HEART DISEASE OF NATIVE CORONARY ARTERY WITHOUT ANGINA PECTORIS: Chronic | Status: ACTIVE | Noted: 2018-05-02

## 2020-03-13 PROCEDURE — 93000 ELECTROCARDIOGRAM COMPLETE: CPT

## 2020-03-13 PROCEDURE — 99214 OFFICE O/P EST MOD 30 MIN: CPT

## 2020-03-13 RX ORDER — INSULIN DEGLUDEC INJECTION 100 U/ML
100 INJECTION, SOLUTION SUBCUTANEOUS
Refills: 0 | Status: ACTIVE | COMMUNITY

## 2020-03-13 RX ORDER — INSULIN HUMAN 4-8-12(60)
4 & 8 & 12 KIT INHALATION
Refills: 0 | Status: ACTIVE | COMMUNITY

## 2020-03-13 RX ORDER — AMLODIPINE BESYLATE 5 MG/1
5 TABLET ORAL
Qty: 30 | Refills: 0 | Status: DISCONTINUED | COMMUNITY
Start: 2020-03-13 | End: 2020-03-13

## 2020-03-13 RX ORDER — LOSARTAN POTASSIUM 100 MG/1
100 TABLET, FILM COATED ORAL DAILY
Qty: 30 | Refills: 6 | Status: ACTIVE | COMMUNITY

## 2020-03-13 NOTE — ASSESSMENT
[FreeTextEntry1] : 55 yo male with pmhx and presentation as above\par cad/unstable angina\par cont all card meds\par add imdur\par set up for urgent cath\par bp/dm mx\par aggressive risk modif\par diet and act as tolerated\par rtc 2-3 weeks after cath

## 2020-03-13 NOTE — REVIEW OF SYSTEMS
[Heartburn] : heartburn [see HPI] : see HPI [Tingling (Paresthesia)] : tingling [Negative] : Heme/Lymph

## 2020-03-13 NOTE — PHYSICAL EXAM
[General Appearance - Well Developed] : well developed [Normal Appearance] : normal appearance [Well Groomed] : well groomed [General Appearance - Well Nourished] : well nourished [No Deformities] : no deformities [General Appearance - In No Acute Distress] : no acute distress [Normal Oral Mucosa] : normal oral mucosa [Normal Jugular Venous A Waves Present] : normal jugular venous A waves present [Normal Jugular Venous V Waves Present] : normal jugular venous V waves present [No Jugular Venous Hensley A Waves] : no jugular venous hensley A waves [Respiration, Rhythm And Depth] : normal respiratory rhythm and effort [Exaggerated Use Of Accessory Muscles For Inspiration] : no accessory muscle use [Auscultation Breath Sounds / Voice Sounds] : lungs were clear to auscultation bilaterally [Heart Rate And Rhythm] : heart rate and rhythm were normal [Heart Sounds] : normal S1 and S2 [Murmurs] : no murmurs present [Abdomen Soft] : soft [Abdomen Tenderness] : non-tender [Abdomen Mass (___ Cm)] : no abdominal mass palpated [Nail Clubbing] : no clubbing of the fingernails [Cyanosis, Localized] : no localized cyanosis [Petechial Hemorrhages (___cm)] : no petechial hemorrhages [] : no ischemic changes [Skin Color & Pigmentation] : normal skin color and pigmentation [Oriented To Time, Place, And Person] : oriented to person, place, and time

## 2020-03-13 NOTE — HISTORY OF PRESENT ILLNESS
[FreeTextEntry1] : 53 yo male with pmhx of cad/multivessel pci is here for a post hx f/up visit\par admitted to [facility] with UA/HTN, r/out for ami\par was advised for cath\par + left sided chest/neck pain, radiating to the chest\par et stable\par complaint with meds\par ros is otherwise as below

## 2020-03-13 NOTE — REASON FOR VISIT
[Follow-Up - From Hospitalization] : follow-up of a recent hospitalization for [FreeTextEntry2] : cad

## 2020-03-16 ENCOUNTER — OUTPATIENT (OUTPATIENT)
Dept: OUTPATIENT SERVICES | Facility: HOSPITAL | Age: 55
LOS: 1 days | Discharge: HOME | End: 2020-03-16
Payer: COMMERCIAL

## 2020-03-16 VITALS — HEIGHT: 68.9 IN | WEIGHT: 195.11 LBS

## 2020-03-16 DIAGNOSIS — Z98.890 OTHER SPECIFIED POSTPROCEDURAL STATES: Chronic | ICD-10-CM

## 2020-03-16 DIAGNOSIS — Z90.49 ACQUIRED ABSENCE OF OTHER SPECIFIED PARTS OF DIGESTIVE TRACT: Chronic | ICD-10-CM

## 2020-03-16 LAB
ANION GAP SERPL CALC-SCNC: 11 MMOL/L — SIGNIFICANT CHANGE UP (ref 7–14)
ANION GAP SERPL CALC-SCNC: 12 MMOL/L — SIGNIFICANT CHANGE UP (ref 7–14)
BUN SERPL-MCNC: 17 MG/DL — SIGNIFICANT CHANGE UP (ref 10–20)
BUN SERPL-MCNC: 19 MG/DL — SIGNIFICANT CHANGE UP (ref 10–20)
CALCIUM SERPL-MCNC: 8.9 MG/DL — SIGNIFICANT CHANGE UP (ref 8.5–10.1)
CALCIUM SERPL-MCNC: 9.2 MG/DL — SIGNIFICANT CHANGE UP (ref 8.5–10.1)
CHLORIDE SERPL-SCNC: 104 MMOL/L — SIGNIFICANT CHANGE UP (ref 98–110)
CHLORIDE SERPL-SCNC: 106 MMOL/L — SIGNIFICANT CHANGE UP (ref 98–110)
CO2 SERPL-SCNC: 22 MMOL/L — SIGNIFICANT CHANGE UP (ref 17–32)
CO2 SERPL-SCNC: 25 MMOL/L — SIGNIFICANT CHANGE UP (ref 17–32)
CREAT SERPL-MCNC: 0.8 MG/DL — SIGNIFICANT CHANGE UP (ref 0.7–1.5)
CREAT SERPL-MCNC: 0.8 MG/DL — SIGNIFICANT CHANGE UP (ref 0.7–1.5)
GLUCOSE SERPL-MCNC: 183 MG/DL — HIGH (ref 70–99)
GLUCOSE SERPL-MCNC: 207 MG/DL — HIGH (ref 70–99)
HCT VFR BLD CALC: 39.3 % — LOW (ref 42–52)
HCT VFR BLD CALC: 41 % — LOW (ref 42–52)
HGB BLD-MCNC: 13.9 G/DL — LOW (ref 14–18)
HGB BLD-MCNC: 14.6 G/DL — SIGNIFICANT CHANGE UP (ref 14–18)
MCHC RBC-ENTMCNC: 31.5 PG — HIGH (ref 27–31)
MCHC RBC-ENTMCNC: 31.7 PG — HIGH (ref 27–31)
MCHC RBC-ENTMCNC: 35.4 G/DL — SIGNIFICANT CHANGE UP (ref 32–37)
MCHC RBC-ENTMCNC: 35.6 G/DL — SIGNIFICANT CHANGE UP (ref 32–37)
MCV RBC AUTO: 89.1 FL — SIGNIFICANT CHANGE UP (ref 80–94)
MCV RBC AUTO: 89.1 FL — SIGNIFICANT CHANGE UP (ref 80–94)
NRBC # BLD: 0 /100 WBCS — SIGNIFICANT CHANGE UP (ref 0–0)
NRBC # BLD: 0 /100 WBCS — SIGNIFICANT CHANGE UP (ref 0–0)
PLATELET # BLD AUTO: 200 K/UL — SIGNIFICANT CHANGE UP (ref 130–400)
PLATELET # BLD AUTO: 216 K/UL — SIGNIFICANT CHANGE UP (ref 130–400)
POTASSIUM SERPL-MCNC: 4.4 MMOL/L — SIGNIFICANT CHANGE UP (ref 3.5–5)
POTASSIUM SERPL-MCNC: 4.6 MMOL/L — SIGNIFICANT CHANGE UP (ref 3.5–5)
POTASSIUM SERPL-SCNC: 4.4 MMOL/L — SIGNIFICANT CHANGE UP (ref 3.5–5)
POTASSIUM SERPL-SCNC: 4.6 MMOL/L — SIGNIFICANT CHANGE UP (ref 3.5–5)
RBC # BLD: 4.41 M/UL — LOW (ref 4.7–6.1)
RBC # BLD: 4.6 M/UL — LOW (ref 4.7–6.1)
RBC # FLD: 12.7 % — SIGNIFICANT CHANGE UP (ref 11.5–14.5)
RBC # FLD: 12.7 % — SIGNIFICANT CHANGE UP (ref 11.5–14.5)
SODIUM SERPL-SCNC: 139 MMOL/L — SIGNIFICANT CHANGE UP (ref 135–146)
SODIUM SERPL-SCNC: 141 MMOL/L — SIGNIFICANT CHANGE UP (ref 135–146)
WBC # BLD: 5.53 K/UL — SIGNIFICANT CHANGE UP (ref 4.8–10.8)
WBC # BLD: 5.94 K/UL — SIGNIFICANT CHANGE UP (ref 4.8–10.8)
WBC # FLD AUTO: 5.53 K/UL — SIGNIFICANT CHANGE UP (ref 4.8–10.8)
WBC # FLD AUTO: 5.94 K/UL — SIGNIFICANT CHANGE UP (ref 4.8–10.8)

## 2020-03-16 PROCEDURE — 93458 L HRT ARTERY/VENTRICLE ANGIO: CPT | Mod: 26,XU

## 2020-03-16 PROCEDURE — 93010 ELECTROCARDIOGRAM REPORT: CPT

## 2020-03-16 PROCEDURE — 92928 PRQ TCAT PLMT NTRAC ST 1 LES: CPT | Mod: LC

## 2020-03-16 RX ORDER — ASPIRIN/CALCIUM CARB/MAGNESIUM 324 MG
1 TABLET ORAL
Qty: 0 | Refills: 0 | DISCHARGE

## 2020-03-16 RX ORDER — GLIMEPIRIDE 1 MG
0 TABLET ORAL
Qty: 0 | Refills: 0 | DISCHARGE

## 2020-03-16 RX ORDER — NIFEDIPINE 30 MG
1 TABLET, EXTENDED RELEASE 24 HR ORAL
Qty: 0 | Refills: 0 | DISCHARGE

## 2020-03-16 RX ORDER — CARVEDILOL PHOSPHATE 80 MG/1
12.5 CAPSULE, EXTENDED RELEASE ORAL
Qty: 0 | Refills: 0 | DISCHARGE

## 2020-03-16 RX ORDER — ATORVASTATIN CALCIUM 80 MG/1
1 TABLET, FILM COATED ORAL
Qty: 0 | Refills: 0 | DISCHARGE

## 2020-03-16 RX ORDER — LISINOPRIL 2.5 MG/1
1 TABLET ORAL
Qty: 0 | Refills: 0 | DISCHARGE

## 2020-03-16 RX ORDER — ATORVASTATIN CALCIUM 80 MG/1
1 TABLET, FILM COATED ORAL
Qty: 0 | Refills: 0 | DISCHARGE
Start: 2020-03-16

## 2020-03-16 RX ORDER — ISOSORBIDE MONONITRATE 60 MG/1
1 TABLET, EXTENDED RELEASE ORAL
Qty: 0 | Refills: 0 | DISCHARGE

## 2020-03-16 RX ORDER — INSULIN HUMAN 4-8-12(60)
0 KIT INHALATION
Qty: 0 | Refills: 0 | DISCHARGE

## 2020-03-16 RX ORDER — CANAGLIFLOZIN AND METFORMIN HYDROCHLORIDE 50; 500 MG/1; MG/1
1 TABLET, FILM COATED, EXTENDED RELEASE ORAL
Qty: 0 | Refills: 0 | DISCHARGE

## 2020-03-16 RX ORDER — LOSARTAN POTASSIUM 100 MG/1
1 TABLET, FILM COATED ORAL
Qty: 0 | Refills: 0 | DISCHARGE

## 2020-03-16 RX ORDER — PRASUGREL 5 MG/1
1 TABLET, FILM COATED ORAL
Qty: 30 | Refills: 0
Start: 2020-03-16 | End: 2020-04-14

## 2020-03-16 RX ORDER — INSULIN DEGLUDEC 100 U/ML
32 INJECTION, SOLUTION SUBCUTANEOUS
Qty: 0 | Refills: 0 | DISCHARGE

## 2020-03-16 NOTE — CHART NOTE - NSCHARTNOTEFT_GEN_A_CORE
PRE-OP DIAGNOSIS:  unstable angina, hx of CAD s/p PCI to RCA and OM, HTN DM DL    PROCEDURE: [facility] with coronary angiography    Physician: Dr Borja  Assistant: Tami    ANESTHESIA TYPE:  [  ]General Anesthesia  [  ] Sedation  [ x ] Local/Regional    ESTIMATED BLOOD LOSS:    10   mL    CONDITION  [  ] Critical  [  ] Serious  [  ]Fair  [ x]Good      SPECIMENS REMOVED (IF APPLICABLE): N/A      IV CONTRAST:    150        mL      IMPLANTS (IF APPLICABLE)      FINDINGS    Left Heart Catheterization:    LVEDP: normal         LEFT HEART CATHETERIZATION                                    Left main normal     LAD:  Prox ectatic , MId 40% lesion  moderate diffuse disease, distal 50% lesion                        Diag: Ostial 90% lesion     Left Circumflex: ectatic moderate disease  OM: patent stent  LPL: 80% lesion.     Right Coronary Artery: Prox ectatic moderate disease, MId patent stent with mild in stent stenosis, distal mild disease   RPDA mild disease small          DOMINANCE: co dominant     ACCESS: right radial  CLOSURE: D stat    INTERVENTION  PCI to LPL. SYNERY 2.5 x 24 mm            PLAN OF CARE  [x ] D/C Home today  [ ]  D/C in AM  [ ] Return to In-patient bed  [ ] Admit for observation  [ ] Return for staged procedure:  [ ] CT Surgery consult called  [x ]  DAPT, B-blocker & Statin therapy    Results of procedure/ plan of care discussed with patient/  in detail.

## 2020-03-16 NOTE — H&P CARDIOLOGY - HISTORY OF PRESENT ILLNESS
54 year old male patient with PMHx of HTN, DL, DM CAD s/p PCI to RCA and LCx in 2014 presented for [facility] , complains of new chest pain started few days ago  seen at bedside,, hemodynamically stable  ALLergy: PCN    Pre cath note:    indication:  [ ] STEMI                [ ] NSTEMI                 [ ] Acute coronary syndrome                     [x ]Unstable Angina   [ ] high risk  [ ] intermediate risk  [ ] low risk                     [ ] Stable Angina     non-invasive testing:                          Date:                     result: [ ] high risk  [ ] intermediate risk  [ ] low risk    Anti- Anginal medications:                    [ ] not used                       [ ] used                   [ ] not used but strong indication not to use    Ejection Fraction                   [ ] <29            [ ] 30-39%   [ ] 40-49%     [x ]>50%    CHF                   [ ] active (within last 14 days on meds   [ ] Chronic (on meds but no exacerbation)    COPD                   [ ] mild (on chronic bronchodilators)  [ ] moderate (on chronic steroid therapy)      [ ] severe (indication for home O2 or PACO2 >50)    Other risk factors:                       [ ] Previous MI                     [ ] CVA/ stroke                    [ ] carotid stent/ CEA                    [ ] PVD/PAD- (arterial aneurysm, non-palpable pulses, tortuous vessel with inability to insert catheter, infra-renal dissection, renal or subclavian artery stenosis)                    [x ] diabetic                    [ ] previous CABG                    [ ] Renal Failure                           14.6   5.53  )-----------( 216      ( 16 Mar 2020 12:21 )             41.0

## 2020-03-16 NOTE — PROGRESS NOTE ADULT - SUBJECTIVE AND OBJECTIVE BOX
Cardiology Follow up    EDUARDO REYES   54y Male  PAST MEDICAL & SURGICAL HISTORY:  Coronary artery disease  High cholesterol  Hypertension, unspecified type  H/O shoulder surgery  H/O knee surgery  History of appendectomy       HPI:  54 year old male patient with PMHx of HTN, DL, DM CAD s/p PCI to RCA and LCx in 2014 presented for [facility] , complains of new chest pain started few days ago  seen at bedside,, hemodynamically stable  ALLergy: PCN    Pre cath note:    indication:  [ ] STEMI                [ ] NSTEMI                 [ ] Acute coronary syndrome                     [x ]Unstable Angina   [ ] high risk  [ ] intermediate risk  [ ] low risk                     [ ] Stable Angina     non-invasive testing:                          Date:                     result: [ ] high risk  [ ] intermediate risk  [ ] low risk    Anti- Anginal medications:                    [ ] not used                       [ ] used                   [ ] not used but strong indication not to use    Ejection Fraction                   [ ] <29            [ ] 30-39%   [ ] 40-49%     [x ]>50%    CHF                   [ ] active (within last 14 days on meds   [ ] Chronic (on meds but no exacerbation)    COPD                   [ ] mild (on chronic bronchodilators)  [ ] moderate (on chronic steroid therapy)      [ ] severe (indication for home O2 or PACO2 >50)    Other risk factors:                       [ ] Previous MI                     [ ] CVA/ stroke                    [ ] carotid stent/ CEA                    [ ] PVD/PAD- (arterial aneurysm, non-palpable pulses, tortuous vessel with inability to insert catheter, infra-renal dissection, renal or subclavian artery stenosis)                    [x ] diabetic                    [ ] previous CABG                    [ ] Renal Failure                           14.6   5.53  )-----------( 216      ( 16 Mar 2020 12:21 )             41.0 (16 Mar 2020 12:40)    Allergies    penicillin (Unknown)    Intolerances      Patient without complaints. Pt ambulated without issues/symptoms  Denies CP, SOB, palpitations, or dizziness  No events on telemetry    REVIEW OF SYSTEMS:          CONSTITUTIONAL: No weakness, fevers or chills          EYES/ENT: No visual changes;  No vertigo or throat pain           NECK: No pain or stiffness          RESPIRATORY: No cough, wheezing, hemoptysis          CARDIOVASCULAR: no pain, no URIAS, no palpitations           GASTROINTESTINAL: No abdominal or epigastric pain. No nausea, vomiting, or hematemesis;           GENITOURINARY: No dysuria, frequency or hematuria          NEUROLOGICAL: No numbness or weakness          SKIN: No itching, rashes    PHYSICAL EXAM:           CONSTITUTIONAL: Well-developed; well-nourished; in no acute distress  	SKIN: warm, dry  	HEAD: Normocephalic; atraumatic  	EYES: PERRL.  	ENT: No nasal discharge, airway clear, mucous membranes moist  	NECK: Supple; non tender.  	CARD: +S1, +S2, no murmurs, gallops, or rubs. (Regular) rate and rhythm    	RESP: No wheezes, rales or rhonchi. CTA B/L  	ABD: soft ntnd, + BS x 4 quadrants  	EXT: moves all extremities,  no clubbing, cyanosis or edema  	NEURO: Alert and oriented x3, no focal deficits          PSYCH: Cooperative, appropriate          VASCULAR:  + Rad / + PTs / + DPs          EXTREMITY  	   Right Radial: D-stat in place, + pulses,  access site soft, no hematoma, no pain, no numbness, no signs and symptoms of infection             ECG: P @ 1800    LABS: P @ 1800                        14.6   5.53  )-----------( 216      ( 16 Mar 2020 12:21 )             41.0     03-16    141  |  104  |  19  ----------------------------<  183<H>  4.6   |  25  |  0.8    Ca    9.2      16 Mar 2020 12:21      A/P:  I discussed the case with Cardiologist Dr. Borja and recommend the following:    S/P PCI LPL  	     Continue DAPT,  B-Blocker, Statin Therapy, ARB with prior meds                   will confirm Effient coverage prior to d/c                    Patient agreeing to take DAPT for at least one year or as directed by cardiologist                    monitor access site                    cc/hr x 6 hrs                    CBC/BMP/EKG @ 1800                   Pt given instructions on importance of taking antiplatelet medication or risk acute stent thrombosis/death                   Post cath instructions, access site care and activity restrictions reviewed with patient                     Discussed with patient to return to hospital if experience chest pain, shortness breath, dizziness and site bleeding                   Aggressive risk factor modification, diet counseling, smoking cessation discussed with patient                       Can discharge patient from cardiac standpoint @ 2000 if labs/ekg/site wnl and ambulating without symptoms                    Follow up with Cardiology Dr. Borja in 1-2 weeks.  Instructed to call and make an appointment Cardiology Follow up    EDUARDO REYES   54y Male  PAST MEDICAL & SURGICAL HISTORY:  Coronary artery disease  High cholesterol  Hypertension, unspecified type  H/O shoulder surgery  H/O knee surgery  History of appendectomy       HPI:  54 year old male patient with PMHx of HTN, DL, DM CAD s/p PCI to RCA and LCx in 2014 presented for [facility] , complains of new chest pain started few days ago  seen at bedside,, hemodynamically stable  ALLergy: PCN    Pre cath note:    indication:  [ ] STEMI                [ ] NSTEMI                 [ ] Acute coronary syndrome                     [x ]Unstable Angina   [ ] high risk  [ ] intermediate risk  [ ] low risk                     [ ] Stable Angina     non-invasive testing:                          Date:                     result: [ ] high risk  [ ] intermediate risk  [ ] low risk    Anti- Anginal medications:                    [ ] not used                       [ ] used                   [ ] not used but strong indication not to use    Ejection Fraction                   [ ] <29            [ ] 30-39%   [ ] 40-49%     [x ]>50%    CHF                   [ ] active (within last 14 days on meds   [ ] Chronic (on meds but no exacerbation)    COPD                   [ ] mild (on chronic bronchodilators)  [ ] moderate (on chronic steroid therapy)      [ ] severe (indication for home O2 or PACO2 >50)    Other risk factors:                       [ ] Previous MI                     [ ] CVA/ stroke                    [ ] carotid stent/ CEA                    [ ] PVD/PAD- (arterial aneurysm, non-palpable pulses, tortuous vessel with inability to insert catheter, infra-renal dissection, renal or subclavian artery stenosis)                    [x ] diabetic                    [ ] previous CABG                    [ ] Renal Failure                           14.6   5.53  )-----------( 216      ( 16 Mar 2020 12:21 )             41.0 (16 Mar 2020 12:40)    Allergies    penicillin (Unknown)    Intolerances      Patient without complaints. Pt ambulated without issues/symptoms  Denies CP, SOB, palpitations, or dizziness  No events on telemetry    REVIEW OF SYSTEMS:          CONSTITUTIONAL: No weakness, fevers or chills          EYES/ENT: No visual changes;  No vertigo or throat pain           NECK: No pain or stiffness          RESPIRATORY: No cough, wheezing, hemoptysis          CARDIOVASCULAR: no pain, no URIAS, no palpitations           GASTROINTESTINAL: No abdominal or epigastric pain. No nausea, vomiting, or hematemesis;           GENITOURINARY: No dysuria, frequency or hematuria          NEUROLOGICAL: No numbness or weakness          SKIN: No itching, rashes    PHYSICAL EXAM:           CONSTITUTIONAL: Well-developed; well-nourished; in no acute distress  	SKIN: warm, dry  	HEAD: Normocephalic; atraumatic  	EYES: PERRL.  	ENT: No nasal discharge, airway clear, mucous membranes moist  	NECK: Supple; non tender.  	CARD: +S1, +S2, no murmurs, gallops, or rubs. (Regular) rate and rhythm    	RESP: No wheezes, rales or rhonchi. CTA B/L  	ABD: soft ntnd, + BS x 4 quadrants  	EXT: moves all extremities,  no clubbing, cyanosis or edema  	NEURO: Alert and oriented x3, no focal deficits          PSYCH: Cooperative, appropriate          VASCULAR:  + Rad / + PTs / + DPs          EXTREMITY  	   Right Radial: D-stat in place, + pulses,  access site soft, no hematoma, no pain, no numbness, no signs and symptoms of infection             ECG: P @ 1800    LABS: P @ 1800                        14.6   5.53  )-----------( 216      ( 16 Mar 2020 12:21 )             41.0     03-16    141  |  104  |  19  ----------------------------<  183<H>  4.6   |  25  |  0.8    Ca    9.2      16 Mar 2020 12:21      A/P:  I discussed the case with Cardiologist Dr. Borja and recommend the following:    S/P PCI LPL  	     Continue DAPT,  B-Blocker, Statin Therapy, ARB with prior meds                   effient copay $15/month, pt aware and agreeable, RX available for pick-up                   Patient agreeing to take DAPT for at least one year or as directed by cardiologist                    monitor access site                    cc/hr x 6 hrs                    CBC/BMP/EKG @ 1800                   Pt given instructions on importance of taking antiplatelet medication or risk acute stent thrombosis/death                   Post cath instructions, access site care and activity restrictions reviewed with patient                     Discussed with patient to return to hospital if experience chest pain, shortness breath, dizziness and site bleeding                   Aggressive risk factor modification, diet counseling, smoking cessation discussed with patient                       Can discharge patient from cardiac standpoint @ 2000 if labs/ekg/site wnl and ambulating without symptoms                    Follow up with Cardiology Dr. Borja in 1-2 weeks.  Instructed to call and make an appointment

## 2020-03-20 ENCOUNTER — APPOINTMENT (OUTPATIENT)
Dept: CARDIOLOGY | Facility: CLINIC | Age: 55
End: 2020-03-20
Payer: COMMERCIAL

## 2020-03-20 VITALS
WEIGHT: 197 LBS | SYSTOLIC BLOOD PRESSURE: 132 MMHG | HEART RATE: 70 BPM | HEIGHT: 69 IN | BODY MASS INDEX: 29.18 KG/M2 | DIASTOLIC BLOOD PRESSURE: 80 MMHG

## 2020-03-20 DIAGNOSIS — Z95.5 PRESENCE OF CORONARY ANGIOPLASTY IMPLANT AND GRAFT: ICD-10-CM

## 2020-03-20 DIAGNOSIS — E11.9 TYPE 2 DIABETES MELLITUS WITHOUT COMPLICATIONS: ICD-10-CM

## 2020-03-20 DIAGNOSIS — I25.110 ATHEROSCLEROTIC HEART DISEASE OF NATIVE CORONARY ARTERY WITH UNSTABLE ANGINA PECTORIS: ICD-10-CM

## 2020-03-20 DIAGNOSIS — I10 ESSENTIAL (PRIMARY) HYPERTENSION: ICD-10-CM

## 2020-03-20 DIAGNOSIS — Z88.0 ALLERGY STATUS TO PENICILLIN: ICD-10-CM

## 2020-03-20 DIAGNOSIS — Z87.891 PERSONAL HISTORY OF NICOTINE DEPENDENCE: ICD-10-CM

## 2020-03-20 DIAGNOSIS — I20.0 UNSTABLE ANGINA: ICD-10-CM

## 2020-03-20 DIAGNOSIS — Z79.4 LONG TERM (CURRENT) USE OF INSULIN: ICD-10-CM

## 2020-03-20 DIAGNOSIS — E78.49 OTHER HYPERLIPIDEMIA: ICD-10-CM

## 2020-03-20 PROCEDURE — 99214 OFFICE O/P EST MOD 30 MIN: CPT

## 2020-03-20 PROCEDURE — 93000 ELECTROCARDIOGRAM COMPLETE: CPT

## 2020-03-20 NOTE — ASSESSMENT
[FreeTextEntry1] : 53 yo male with pmhx and presentation as above\par cad/unstable angina\par cont all card meds\par add ccb, refill effient\par bp/dm/lipids mx\par aggressive risk modif\par diet and act as tolerated\par rtc 2-3 months

## 2020-03-20 NOTE — HISTORY OF PRESENT ILLNESS
[FreeTextEntry1] : 55 yo male with pmhx of cad/multivessel pci is here for a post hx f/up visit\[place] [facility] [place], r/out for ami\par s/p cath/pci of lpda with verenice\par cp much better, no other cvs complains\par et stable\par complaint with meds and diet\par ros is otherwise as below

## 2020-07-14 RX ORDER — PRASUGREL 10 MG/1
10 TABLET, FILM COATED ORAL DAILY
Qty: 90 | Refills: 3 | Status: ACTIVE | COMMUNITY
Start: 1900-01-01 | End: 1900-01-01

## 2020-07-14 RX ORDER — ISOSORBIDE MONONITRATE 30 MG/1
30 TABLET, EXTENDED RELEASE ORAL
Qty: 30 | Refills: 5 | Status: ACTIVE | COMMUNITY
Start: 2020-03-13 | End: 1900-01-01

## 2020-07-24 ENCOUNTER — APPOINTMENT (OUTPATIENT)
Dept: CARDIOLOGY | Facility: CLINIC | Age: 55
End: 2020-07-24

## 2020-07-28 ENCOUNTER — APPOINTMENT (OUTPATIENT)
Dept: CARDIOLOGY | Facility: CLINIC | Age: 55
End: 2020-07-28
Payer: COMMERCIAL

## 2020-07-28 VITALS — BODY MASS INDEX: 28.8 KG/M2 | WEIGHT: 195 LBS

## 2020-07-28 VITALS — DIASTOLIC BLOOD PRESSURE: 70 MMHG | SYSTOLIC BLOOD PRESSURE: 152 MMHG

## 2020-07-28 VITALS — SYSTOLIC BLOOD PRESSURE: 148 MMHG | DIASTOLIC BLOOD PRESSURE: 86 MMHG | HEART RATE: 67 BPM

## 2020-07-28 PROCEDURE — 93000 ELECTROCARDIOGRAM COMPLETE: CPT

## 2020-07-28 PROCEDURE — 99214 OFFICE O/P EST MOD 30 MIN: CPT

## 2020-07-28 RX ORDER — NEOMYCIN AND POLYMYXIN B SULFATES AND DEXAMETHASONE 3.5; 10000; 1 MG/G; [IU]/G; MG/G
3.5-10000-0.1 OINTMENT OPHTHALMIC
Qty: 4 | Refills: 0 | Status: ACTIVE | COMMUNITY
Start: 2020-06-25

## 2020-07-28 RX ORDER — ATORVASTATIN CALCIUM 20 MG/1
20 TABLET, FILM COATED ORAL
Qty: 30 | Refills: 0 | Status: ACTIVE | COMMUNITY
Start: 2020-07-11

## 2020-07-28 RX ORDER — METFORMIN HYDROCHLORIDE 500 MG/1
500 TABLET, COATED ORAL
Qty: 60 | Refills: 0 | Status: ACTIVE | COMMUNITY
Start: 2020-06-22

## 2020-07-28 RX ORDER — FLUTICASONE PROPIONATE 50 UG/1
50 SPRAY, METERED NASAL
Qty: 16 | Refills: 0 | Status: ACTIVE | COMMUNITY
Start: 2020-06-30

## 2020-07-28 RX ORDER — PEN NEEDLE, DIABETIC 29 G X1/2"
32G X 4 MM NEEDLE, DISPOSABLE MISCELLANEOUS
Qty: 100 | Refills: 0 | Status: ACTIVE | COMMUNITY
Start: 2020-07-07

## 2020-07-28 RX ORDER — OMEPRAZOLE 40 MG/1
40 CAPSULE, DELAYED RELEASE ORAL
Qty: 30 | Refills: 0 | Status: ACTIVE | COMMUNITY
Start: 2020-06-30

## 2020-07-28 RX ORDER — ATORVASTATIN CALCIUM 80 MG/1
80 TABLET, FILM COATED ORAL
Qty: 90 | Refills: 0 | Status: DISCONTINUED | COMMUNITY
End: 2020-07-28

## 2020-07-28 RX ORDER — INSULIN HUMAN 8 UNIT(90)
90 X 8 UNIT & KIT INHALATION
Qty: 180 | Refills: 0 | Status: ACTIVE | COMMUNITY
Start: 2020-07-21

## 2020-07-28 RX ORDER — FAMOTIDINE 20 MG/1
20 TABLET, FILM COATED ORAL
Qty: 180 | Refills: 3 | Status: ACTIVE | COMMUNITY
Start: 2020-07-03 | End: 1900-01-01

## 2020-07-28 RX ORDER — NIFEDIPINE 30 MG/1
30 TABLET, EXTENDED RELEASE ORAL
Qty: 30 | Refills: 0 | Status: DISCONTINUED | COMMUNITY
Start: 2020-01-25 | End: 2020-07-28

## 2020-07-28 RX ORDER — FUROSEMIDE 80 MG/1
80 TABLET ORAL
Qty: 7 | Refills: 0 | Status: ACTIVE | COMMUNITY
Start: 2020-02-13

## 2020-07-28 RX ORDER — MONTELUKAST 10 MG/1
10 TABLET, FILM COATED ORAL
Qty: 30 | Refills: 0 | Status: ACTIVE | COMMUNITY
Start: 2020-02-13

## 2020-07-28 RX ORDER — AZITHROMYCIN 250 MG/1
250 TABLET, FILM COATED ORAL
Qty: 6 | Refills: 0 | Status: ACTIVE | COMMUNITY
Start: 2020-02-20

## 2020-07-28 RX ORDER — LANCETS 30 GAUGE
EACH MISCELLANEOUS
Qty: 100 | Refills: 0 | Status: ACTIVE | COMMUNITY
Start: 2020-07-07

## 2020-07-28 RX ORDER — ASPIRIN 81 MG/1
81 TABLET, CHEWABLE ORAL
Qty: 30 | Refills: 0 | Status: ACTIVE | COMMUNITY
Start: 2020-07-07

## 2020-07-28 RX ORDER — AMLODIPINE BESYLATE 5 MG/1
5 TABLET ORAL DAILY
Qty: 90 | Refills: 3 | Status: DISCONTINUED | COMMUNITY
Start: 2020-03-16 | End: 2020-07-28

## 2020-07-28 RX ORDER — ISOPRPOPYL ALCOHOL 70 ML/100ML
70 SWAB TOPICAL
Qty: 100 | Refills: 0 | Status: ACTIVE | COMMUNITY
Start: 2020-07-07

## 2020-07-28 NOTE — ASSESSMENT
[FreeTextEntry1] : 54 yo male with pmhx and presentation as above\par cad/unstable angina\par cont all card meds\par labs reviewed, needs better dm mx\par bp on the higher side, will cont to monitor\par bp/dm/lipids mx\par aggressive risk modif\par diet and act as tolerated\par rtc 4 months

## 2020-07-28 NOTE — HISTORY OF PRESENT ILLNESS
[FreeTextEntry1] : 56 yo male with pmhx of cad/multivessel pci is here for a f/up visit\par [place]20 [place] [facility] [place], r/out for ami\par 3/20 s/p cath/pci of lpda with verenice\par occasional cp, no other cvs complains\par et stable\par complaint with meds and diet\par ros is otherwise as below

## 2020-10-27 ENCOUNTER — APPOINTMENT (OUTPATIENT)
Dept: CARDIOLOGY | Facility: CLINIC | Age: 55
End: 2020-10-27
Payer: COMMERCIAL

## 2020-10-27 VITALS
HEIGHT: 69 IN | HEART RATE: 73 BPM | OXYGEN SATURATION: 97 % | WEIGHT: 206 LBS | RESPIRATION RATE: 20 BRPM | BODY MASS INDEX: 30.51 KG/M2 | TEMPERATURE: 97.2 F | DIASTOLIC BLOOD PRESSURE: 98 MMHG | SYSTOLIC BLOOD PRESSURE: 136 MMHG

## 2020-10-27 DIAGNOSIS — E11.9 TYPE 2 DIABETES MELLITUS W/OUT COMPLICATIONS: ICD-10-CM

## 2020-10-27 DIAGNOSIS — E78.9 DISORDER OF LIPOPROTEIN METABOLISM, UNSPECIFIED: ICD-10-CM

## 2020-10-27 DIAGNOSIS — I20.9 ANGINA PECTORIS, UNSPECIFIED: ICD-10-CM

## 2020-10-27 DIAGNOSIS — I10 ESSENTIAL (PRIMARY) HYPERTENSION: ICD-10-CM

## 2020-10-27 DIAGNOSIS — I25.10 ATHEROSCLEROTIC HEART DISEASE OF NATIVE CORONARY ARTERY W/OUT ANGINA PECTORIS: ICD-10-CM

## 2020-10-27 PROCEDURE — 93000 ELECTROCARDIOGRAM COMPLETE: CPT

## 2020-10-27 PROCEDURE — 99072 ADDL SUPL MATRL&STAF TM PHE: CPT

## 2020-10-27 PROCEDURE — 99214 OFFICE O/P EST MOD 30 MIN: CPT

## 2020-10-27 NOTE — HISTORY OF PRESENT ILLNESS
[FreeTextEntry1] : 54 yo male with pmhx of cad/multivessel pci is here for a f/up visit\par 3/20 [place] [facility] [place]N, r/out for ami\par 3/20 s/p cath/pci of lpda with verenice\par + weight gain\par bp has been labile and mostly elevated, no other cvs complains\par et stable\par complaint with meds and diet\par ros is otherwise as below

## 2020-10-27 NOTE — ASSESSMENT
[FreeTextEntry1] : 54 yo male with pmhx and presentation as above\par cad/multivessel pci\par cont all card meds\par increase coreg to 25, cont to monitor bp\par repeat labs with pmd\par bp/dm/lipids mx\par aggressive risk modif\par diet and act as tolerated\par rtc 4 months

## 2020-12-29 ENCOUNTER — APPOINTMENT (OUTPATIENT)
Dept: CARDIOLOGY | Facility: CLINIC | Age: 55
End: 2020-12-29
